# Patient Record
Sex: FEMALE | URBAN - METROPOLITAN AREA
[De-identification: names, ages, dates, MRNs, and addresses within clinical notes are randomized per-mention and may not be internally consistent; named-entity substitution may affect disease eponyms.]

---

## 2020-07-23 ENCOUNTER — APPOINTMENT (RX ONLY)
Dept: URBAN - METROPOLITAN AREA CLINIC 113 | Facility: CLINIC | Age: 55
Setting detail: DERMATOLOGY
End: 2020-07-23

## 2020-07-23 VITALS — TEMPERATURE: 97.5 F

## 2020-07-23 DIAGNOSIS — R20.2 PARESTHESIA OF SKIN: ICD-10-CM

## 2020-07-23 DIAGNOSIS — D18.0 HEMANGIOMA: ICD-10-CM

## 2020-07-23 PROBLEM — D18.01 HEMANGIOMA OF SKIN AND SUBCUTANEOUS TISSUE: Status: ACTIVE | Noted: 2020-07-23

## 2020-07-23 PROCEDURE — ? PRESCRIPTION

## 2020-07-23 PROCEDURE — 99202 OFFICE O/P NEW SF 15 MIN: CPT

## 2020-07-23 PROCEDURE — ? COUNSELING

## 2020-07-23 PROCEDURE — ? ADDITIONAL NOTES

## 2020-07-23 RX ORDER — BETAMETHASONE DIPROPIONATE 0.5 MG/G
CREAM TOPICAL
Qty: 1 | Refills: 0 | Status: ERX | COMMUNITY
Start: 2020-07-23

## 2020-07-23 RX ADMIN — BETAMETHASONE DIPROPIONATE: 0.5 CREAM TOPICAL at 00:00

## 2020-07-23 ASSESSMENT — LOCATION ZONE DERM: LOCATION ZONE: TRUNK

## 2020-07-23 ASSESSMENT — LOCATION DETAILED DESCRIPTION DERM: LOCATION DETAILED: LEFT MID-UPPER BACK

## 2020-07-23 ASSESSMENT — LOCATION SIMPLE DESCRIPTION DERM: LOCATION SIMPLE: LEFT UPPER BACK

## 2020-07-23 NOTE — PROCEDURE: ADDITIONAL NOTES
Additional Notes: Patient explains she’s experiencing a burning sensation on her left scapula for sometime. Upon closer examination there were no overlying skin rashes or even redness or scaling. 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 I recommended pt to follow up with neurology for head and neck evaluation. Patient explained that the lesions do not bother her very much, she just wanted to make sure that she did not have anything growing on her skin. I reassured the patient that she does not have skin cancer, infection or any type of irritation. I recommended betamethasone oint to be applied twice daily when she has symptoms to help relieve any inflammation she may be experiencing. Patient verbalized understanding.
Detail Level: Zone